# Patient Record
Sex: MALE | Race: WHITE | NOT HISPANIC OR LATINO | Employment: FULL TIME | URBAN - NONMETROPOLITAN AREA
[De-identification: names, ages, dates, MRNs, and addresses within clinical notes are randomized per-mention and may not be internally consistent; named-entity substitution may affect disease eponyms.]

---

## 2018-02-22 ENCOUNTER — DOCUMENTATION ONLY (OUTPATIENT)
Dept: FAMILY MEDICINE | Facility: OTHER | Age: 23
End: 2018-02-22

## 2019-08-21 ENCOUNTER — OFFICE VISIT (OUTPATIENT)
Dept: FAMILY MEDICINE | Facility: OTHER | Age: 24
End: 2019-08-21
Attending: NURSE PRACTITIONER
Payer: COMMERCIAL

## 2019-08-21 VITALS
SYSTOLIC BLOOD PRESSURE: 124 MMHG | RESPIRATION RATE: 18 BRPM | TEMPERATURE: 98.5 F | WEIGHT: 297.5 LBS | HEART RATE: 87 BPM | HEIGHT: 69 IN | OXYGEN SATURATION: 96 % | BODY MASS INDEX: 44.06 KG/M2 | DIASTOLIC BLOOD PRESSURE: 86 MMHG

## 2019-08-21 DIAGNOSIS — R39.89 URINARY PROBLEM: ICD-10-CM

## 2019-08-21 DIAGNOSIS — R30.0 DYSURIA: Primary | ICD-10-CM

## 2019-08-21 LAB
ALBUMIN UR-MCNC: NEGATIVE MG/DL
APPEARANCE UR: CLEAR
BILIRUB UR QL STRIP: NEGATIVE
C TRACH DNA SPEC QL PROBE+SIG AMP: NOT DETECTED
COLOR UR AUTO: YELLOW
GLUCOSE UR STRIP-MCNC: NEGATIVE MG/DL
HGB UR QL STRIP: NEGATIVE
KETONES UR STRIP-MCNC: NEGATIVE MG/DL
LEUKOCYTE ESTERASE UR QL STRIP: NEGATIVE
N GONORRHOEA DNA SPEC QL PROBE+SIG AMP: NOT DETECTED
NITRATE UR QL: NEGATIVE
PH UR STRIP: 6 PH (ref 5–9)
SOURCE: NORMAL
SP GR UR STRIP: <1.005 (ref 1–1.03)
SPECIMEN SOURCE: NORMAL
UROBILINOGEN UR STRIP-ACNC: 0.2 EU/DL (ref 0.2–1)

## 2019-08-21 PROCEDURE — 87591 N.GONORRHOEAE DNA AMP PROB: CPT | Mod: ZL | Performed by: NURSE PRACTITIONER

## 2019-08-21 PROCEDURE — 99203 OFFICE O/P NEW LOW 30 MIN: CPT | Performed by: NURSE PRACTITIONER

## 2019-08-21 PROCEDURE — 87491 CHLMYD TRACH DNA AMP PROBE: CPT | Mod: ZL | Performed by: NURSE PRACTITIONER

## 2019-08-21 PROCEDURE — 81003 URINALYSIS AUTO W/O SCOPE: CPT | Mod: ZL | Performed by: NURSE PRACTITIONER

## 2019-08-21 ASSESSMENT — ENCOUNTER SYMPTOMS
MUSCULOSKELETAL NEGATIVE: 1
VOMITING: 0
NAUSEA: 0
DIARRHEA: 0
DYSURIA: 1
HEMATOLOGIC/LYMPHATIC NEGATIVE: 1
FREQUENCY: 1
FLANK PAIN: 0
DIFFICULTY URINATING: 0
HEMATURIA: 0
HEARTBURN: 0
CONSTIPATION: 0
POLYPHAGIA: 0
ABDOMINAL PAIN: 0
ABDOMINAL DISTENTION: 0
CONSTITUTIONAL NEGATIVE: 1
NEUROLOGICAL NEGATIVE: 1
POLYDIPSIA: 0

## 2019-08-21 ASSESSMENT — MIFFLIN-ST. JEOR: SCORE: 2319.83

## 2019-08-21 ASSESSMENT — PAIN SCALES - GENERAL: PAINLEVEL: MILD PAIN (2)

## 2019-08-21 NOTE — PROGRESS NOTES
"  SUBJECTIVE:   Owen Guzman is a 24 year old male who presents to clinic today for the following health issues:    HPI  C/o urinary urgency and bladder spasms for 2 days. No history of UTIs. No fever. Diminished appetitive, drinking fluids. Took ibuprofen for symptoms last night. In a monogamous relationship x 7 years. Hasn't been to the doctor in years. BMs normal. No blurry vision, no excessive thirst or sweating.    There are no active problems to display for this patient.    History reviewed. No pertinent past medical history.   History reviewed. No pertinent surgical history.  History reviewed. No pertinent family history.  Social History     Tobacco Use     Smoking status: Never Smoker     Smokeless tobacco: Current User   Substance Use Topics     Alcohol use: No     Alcohol/week: 0.0 oz     Social History     Social History Narrative     Not on file     No current outpatient medications on file.     No Known Allergies    Review of Systems   Constitutional: Negative.    Gastrointestinal: Negative for abdominal distention, abdominal pain, constipation, diarrhea, heartburn, nausea and vomiting.   Endocrine: Negative for polydipsia, polyphagia and polyuria.   Genitourinary: Positive for dysuria and frequency. Negative for difficulty urinating, flank pain, genital sores, hematuria, penile pain, penile swelling, testicular pain and urgency.   Musculoskeletal: Negative.    Skin: Negative.    Allergic/Immunologic: Negative for immunocompromised state.   Neurological: Negative.    Hematological: Negative.         OBJECTIVE:     /86 (BP Location: Right arm, Patient Position: Sitting, Cuff Size: Adult Regular)   Pulse 87   Temp 98.5  F (36.9  C) (Tympanic)   Resp 18   Ht 1.753 m (5' 9\")   Wt 134.9 kg (297 lb 8 oz)   SpO2 96%   BMI 43.93 kg/m    Body mass index is 43.93 kg/m .  Physical Exam   Constitutional: He is oriented to person, place, and time. He appears well-developed and well-nourished. No " distress.   HENT:   Head: Normocephalic and atraumatic.   Nose: Nose normal.   Eyes: Conjunctivae are normal.   Neck: Normal range of motion.   Cardiovascular: Normal rate, regular rhythm and normal heart sounds.   No murmur heard.  Pulmonary/Chest: Effort normal and breath sounds normal. No stridor. No respiratory distress.   Abdominal: Soft. Bowel sounds are normal. He exhibits no distension and no mass. There is no rebound and no guarding.   Mild suprapubic tenderness.  No CVA tenderness.   Musculoskeletal: Normal range of motion.   Neurological: He is alert and oriented to person, place, and time.   Skin: Skin is warm and dry. No rash noted. He is not diaphoretic.   Psychiatric: He has a normal mood and affect. His behavior is normal.   Nursing note and vitals reviewed.      Diagnostic Test Results:  Results for orders placed or performed in visit on 08/21/19 (from the past 24 hour(s))   Urinalysis w Reflex Microscopic If Positive   Result Value Ref Range    Color Urine Yellow     Appearance Urine Clear     Glucose Urine Negative NEG^Negative mg/dL    Bilirubin Urine Negative NEG^Negative    Ketones Urine Negative NEG^Negative mg/dL    Specific Gravity Urine <1.005 1.000 - 1.030    Blood Urine Negative NEG^Negative    pH Urine 6.0 5.0 - 9.0 pH    Protein Albumin Urine Negative NEG^Negative mg/dL    Urobilinogen Urine 0.2 0.2 - 1.0 EU/dL    Nitrite Urine Negative NEG^Negative    Leukocyte Esterase Urine Negative NEG^Negative    Source Midstream Urine    GC/Chlamydia by PCR - [facility]   Result Value Ref Range    Specimen Source Urine     Neisseria gonorrhoreae PCR Not Detected NDET^Not Detected    Chlamydia Trachomatis PCR Not Detected NDET^Not Detected       ASSESSMENT/PLAN:       ICD-10-CM    1. Dysuria R30.0    2. Urinary problem R39.89 Urinalysis w Reflex Microscopic If Positive     GC/Chlamydia by PCR - [facility]     PLAN:  Patient is afebrile, nontoxic-appearing, exam is benign. UA is clear here today.  Order  gonorrhea and chlamydia, call patient with results which were negative. Advised to push fluids, monitor and follow-up in clinic if not improving 24 to 48 hours.  I explained my diagnostic considerations and recommendations to patient who voiced understanding and agreement with the treatment plan. All questions were answered. We discussed potential side effects of any prescribed or recommended therapies, as well as expectations for response to treatments.    Disclaimer:  This note consists of words and symbols derived from keyboarding, dictation, or using voice recognition software. As a result, there may be errors in the script that have gone undetected. Please consider this when interpreting information found in this note.      MCKENNA Rivas, NP-C  8/21/2019 at 5:09 PM  [facility]

## 2019-08-21 NOTE — PATIENT INSTRUCTIONS
"Patient Education   We'll call with results and treat if needed.   Otherwise, follow up in the clinic for re-evaluation.   Go to ER if symptoms worsen.   Dysuria     Painful urination (dysuria) is often caused by a problem in the urinary tract.   Dysuria is pain felt during urination. It is often described as a burning. Learn more about this problem and how it can be treated.  What causes dysuria?  Possible causes include:    Infection with a bacteria or virus such as a urinary tract infection (UTI or a sexually transmitted infection (STI)    Sensitivity or allergy to chemicals such as those found in lotions and other products    Prostate or bladder problems    Radiation therapy to the pelvic area  How is dysuria diagnosed?  Your healthcare provider will examine you. He or she will ask about your symptoms and health. After talking with you and doing a physical exam, your healthcare provider may know what is causing your dysuria. He or she will usually request  a sample of your urine. Tests of your urine, or a \"urinalysis,\" are done. A urinalysis may include:    Looking at the urine sample (visual exam)    Checking for substances (chemical exam)    Looking at a small amount under a microscope (microscopic exam)  Some parts of the urinalysis may be done in the provider's office and some in a lab. And, the urine sample may be checked for bacteria and yeast (urine culture). Your healthcare provider will tell you more about these tests if they are needed.  How is dysuria treated?  Treatment depends on the cause. If you have a bacterial infection, you may need antibiotics. You may be given medicines to make it easier for you to urinate and help relieve pain. Your healthcare provider can tell you more about your treatment options. Untreated, symptoms may get worse.  When to call your healthcare provider  Call the healthcare provider right away if you have any of the following:    Fever of 100.4 F (38 C) or higher     No " improvement after three days of treatment    Trouble urinating because of pain    New or increased discharge from the vagina or penis    Rash or joint pain    Increased back or abdominal pain    Enlarged painful lymph nodes (lumps) in the groin   Date Last Reviewed: 1/1/2017 2000-2018 [employer]. [address] [place] [id number]. All rights reserved. This information is not intended as a substitute for professional medical care. Always follow your healthcare professional's instructions.

## 2019-08-21 NOTE — NURSING NOTE
Patient has not been feeling well for 2 days.  Their symptoms are possible UTI and they are taking ibuprofen.   Lilian Hickman LPN....................  8/21/2019   5:13 PM

## 2020-01-06 ENCOUNTER — OFFICE VISIT (OUTPATIENT)
Dept: FAMILY MEDICINE | Facility: OTHER | Age: 25
End: 2020-01-06
Attending: NURSE PRACTITIONER
Payer: COMMERCIAL

## 2020-01-06 VITALS
DIASTOLIC BLOOD PRESSURE: 86 MMHG | HEART RATE: 100 BPM | SYSTOLIC BLOOD PRESSURE: 136 MMHG | WEIGHT: 294.5 LBS | RESPIRATION RATE: 16 BRPM | TEMPERATURE: 99.4 F | BODY MASS INDEX: 43.62 KG/M2 | OXYGEN SATURATION: 98 % | HEIGHT: 69 IN

## 2020-01-06 DIAGNOSIS — B02.9 HERPES ZOSTER WITHOUT COMPLICATION: Primary | ICD-10-CM

## 2020-01-06 PROCEDURE — 99213 OFFICE O/P EST LOW 20 MIN: CPT | Performed by: NURSE PRACTITIONER

## 2020-01-06 RX ORDER — VALACYCLOVIR HYDROCHLORIDE 1 G/1
1000 TABLET, FILM COATED ORAL 3 TIMES DAILY
Qty: 21 TABLET | Refills: 0 | Status: SHIPPED | OUTPATIENT
Start: 2020-01-06 | End: 2023-04-10

## 2020-01-06 ASSESSMENT — PAIN SCALES - GENERAL: PAINLEVEL: SEVERE PAIN (6)

## 2020-01-06 ASSESSMENT — MIFFLIN-ST. JEOR: SCORE: 2313.96

## 2020-01-06 NOTE — PROGRESS NOTES
"Subjective     Owen Guzman is a 24 year old male who presents to clinic today for the following health issues:    HPI   Rash  Onset: 2 days ago    Description:   Location: L side of neck/hairline, back of L ear  Character: round, painful, burning, red  Itching (Pruritis): no     Progression of Symptoms:  worsening and constant    Accompanying Signs & Symptoms:  Fever: no   Body aches or joint pain: YES- joint pain, not uncommon for him though neck and L shoulder are more sore than usual today  Sore throat symptoms: YES- \"the left side of my throat is really susceptible to cold, like I took a drink of cold pop earlier and I felt the cold all the way down my throat and out to my shoulder\"  Recent cold symptoms: no     History:   Previous similar rash: no     Precipitating factors:   Exposure to similar rash: no   New exposures: None   Recent travel: no     Alleviating factors:  Hydrocortisone - helped to take the \"super sensitivity\" away though was still painful to touch    Therapies Tried and outcome: hydrocortisone, neosporin, washed with soap and washcloth    There is no problem list on file for this patient.    History reviewed. No pertinent surgical history.    Social History     Tobacco Use     Smoking status: Never Smoker     Smokeless tobacco: Current User     Types: Chew   Substance Use Topics     Alcohol use: No     Alcohol/week: 0.0 standard drinks     History reviewed. No pertinent family history.      No current outpatient medications on file.     No Known Allergies    Reviewed and updated as needed this visit by Provider  Tobacco  Allergies  Meds  Problems  Med Hx  Surg Hx  Fam Hx  Soc Hx          Review of Systems   ROS COMP: As above      Objective    /86   Pulse 100   Temp 99.4  F (37.4  C) (Tympanic)   Resp 16   Ht 1.765 m (5' 9.49\")   Wt 133.6 kg (294 lb 8 oz)   SpO2 98%   BMI 42.88 kg/m    Body mass index is 42.88 kg/m .  Physical Exam   GENERAL: healthy, alert and no " "distress  HENT: ear canals and TM's normal, nose and mouth without ulcers or lesions  NECK: no adenopathy, no asymmetry, masses, or scars and thyroid normal to palpation  RESP: lungs clear to auscultation - no rales, rhonchi or wheezes  CV: regular rate and rhythm, normal S1 S2, no S3 or S4, no murmur, click or rub, no peripheral edema and peripheral pulses strong  SKIN: clustered vesicular rash to 3rd dermatome on L side of neck/hairline as below in pictures, no drainage at this time  PSYCH: mentation appears normal, affect normal/bright        Third picture is upside down, top of picture is his proximal chest and bottom of picture is underneath his chin. Taken while laying down on exam table for better visualization.      Diagnostic Test Results:  Labs reviewed in Epic  none         Assessment & Plan     1. Herpes zoster without complication  New onset rash that started as a \"tight skin\" feeling on his neck 2 days ago, then erupted in 2 clusters of vesicles later than night. Since then has spread as in pictures above. States the clusters are very painful, though not itching. Did have a \"bad case\" of chickenpox as a child. Likely shingles, will treat with high dose antiviral for the next 7 days. Return to clinic if worsening or if not improving.   - valACYclovir (VALTREX) 1000 mg tablet; Take 1 tablet (1,000 mg) by mouth 3 times daily for 7 days  Dispense: 21 tablet; Refill: 0     Kala Larsen NP  [facility]        "

## 2020-01-06 NOTE — NURSING NOTE
"Chief Complaint   Patient presents with     Derm Problem     red, raised spots on left side of neck x 2 days       Initial /86   Pulse 100   Temp 99.4  F (37.4  C) (Tympanic)   Resp 16   Ht 1.765 m (5' 9.49\")   Wt 133.6 kg (294 lb 8 oz)   SpO2 98%   BMI 42.88 kg/m   Estimated body mass index is 42.88 kg/m  as calculated from the following:    Height as of this encounter: 1.765 m (5' 9.49\").    Weight as of this encounter: 133.6 kg (294 lb 8 oz).  Medication Reconciliation: complete    Rochelle Ribeiro LPN  "

## 2020-12-27 ENCOUNTER — HEALTH MAINTENANCE LETTER (OUTPATIENT)
Age: 25
End: 2020-12-27

## 2021-10-09 ENCOUNTER — HEALTH MAINTENANCE LETTER (OUTPATIENT)
Age: 26
End: 2021-10-09

## 2022-01-29 ENCOUNTER — HEALTH MAINTENANCE LETTER (OUTPATIENT)
Age: 27
End: 2022-01-29

## 2022-03-30 ENCOUNTER — OFFICE VISIT (OUTPATIENT)
Dept: FAMILY MEDICINE | Facility: OTHER | Age: 27
End: 2022-03-30
Attending: NURSE PRACTITIONER
Payer: COMMERCIAL

## 2022-03-30 VITALS
HEIGHT: 69 IN | RESPIRATION RATE: 14 BRPM | DIASTOLIC BLOOD PRESSURE: 80 MMHG | SYSTOLIC BLOOD PRESSURE: 128 MMHG | OXYGEN SATURATION: 98 % | BODY MASS INDEX: 46.65 KG/M2 | WEIGHT: 315 LBS | TEMPERATURE: 97.8 F | HEART RATE: 98 BPM

## 2022-03-30 DIAGNOSIS — T23.201A SECOND DEGREE BURN OF RIGHT WRIST AND HAND, INITIAL ENCOUNTER: Primary | ICD-10-CM

## 2022-03-30 DIAGNOSIS — T23.271A SECOND DEGREE BURN OF RIGHT WRIST AND HAND, INITIAL ENCOUNTER: Primary | ICD-10-CM

## 2022-03-30 PROBLEM — E66.01 MORBID OBESITY (H): Status: ACTIVE | Noted: 2022-03-30

## 2022-03-30 PROCEDURE — 250N000009 HC RX 250: Performed by: NURSE PRACTITIONER

## 2022-03-30 PROCEDURE — 90471 IMMUNIZATION ADMIN: CPT | Performed by: NURSE PRACTITIONER

## 2022-03-30 PROCEDURE — 99213 OFFICE O/P EST LOW 20 MIN: CPT | Mod: 25 | Performed by: NURSE PRACTITIONER

## 2022-03-30 PROCEDURE — 90715 TDAP VACCINE 7 YRS/> IM: CPT | Performed by: NURSE PRACTITIONER

## 2022-03-30 RX ORDER — SILVER SULFADIAZINE 10 MG/G
CREAM TOPICAL DAILY
Qty: 100 G | Refills: 1 | Status: SHIPPED | OUTPATIENT
Start: 2022-03-30 | End: 2023-04-10

## 2022-03-30 RX ORDER — GINSENG 100 MG
1 CAPSULE ORAL ONCE
Status: DISCONTINUED | OUTPATIENT
Start: 2022-03-30 | End: 2022-03-30

## 2022-03-30 RX ORDER — GINSENG 100 MG
1 CAPSULE ORAL ONCE
Status: COMPLETED | OUTPATIENT
Start: 2022-03-30 | End: 2022-03-30

## 2022-03-30 RX ADMIN — BACITRACIN ZINC 1 G: 500 OINTMENT TOPICAL at 14:40

## 2022-03-30 ASSESSMENT — PAIN SCALES - GENERAL: PAINLEVEL: NO PAIN (0)

## 2022-03-30 NOTE — NURSING NOTE
"Chief Complaint   Patient presents with     Burn     Patient presented to the clinic with a burn on his right wrist/hand that occurred on 3/28/22 from cooking oil. He has been putting triple antibiotic ointment on it and he reported that his mom told him to put A&D ointment on it but he didn't do that. He is here to make sure that he does not need antibiotics, and to make sure he is dressing it right.    Initial /80 (BP Location: Right arm, Patient Position: Sitting, Cuff Size: Adult Large)   Pulse 98   Temp 97.8  F (36.6  C) (Tympanic)   Resp 14   Ht 1.753 m (5' 9\")   Wt 142.9 kg (315 lb)   SpO2 98%   BMI 46.52 kg/m   Estimated body mass index is 46.52 kg/m  as calculated from the following:    Height as of this encounter: 1.753 m (5' 9\").    Weight as of this encounter: 142.9 kg (315 lb).       FOOD SECURITY SCREENING QUESTIONS:    The next two questions are to help us understand your food security.  If you are feeling you need any assistance in this area, we have resources available to support you today.    Hunger Vital Signs:  Within the past 12 months we worried whether our food would run out before we got money to buy more. Never  Within the past 12 months the food we bought just didn't last and we didn't have money to get more. Never      Medication Reconciliation: Complete      Sailaja Cano LPN  "

## 2022-03-30 NOTE — PROGRESS NOTES
ASSESSMENT/PLAN:     I have reviewed the nursing notes.  I have reviewed the findings, diagnosis, plan and need for follow up with the patient.      1. Second degree burn of right wrist and hand, initial encounter    - TDAP (ADACEL,BOOSTRIX)    - silver sulfADIAZINE (SILVADENE) 1 % external cream; Apply topically daily  Dispense: 100 g; Refill: 1    - bacitracin ointment 1 gm applied in clinic along with dry guaze bandage    Wound care for home:  Gently wash daily with antibacterial soap such as Dial and luke warm water, avoid rubbing, pressure and friction  Apply Silvadene cream daily after washing  Apply dry guaze bandage after washing and application of Silvadene, change daily and PRN  Continue to use cream and bandage until all blisters have dried up and no open areas of skin.  May transition to no bandage once skin is dry without any moisture or open areas.    Avoid exposure to heat, hot water, etc until healed  Elevate to reduce swelling    Tetanus updated today in clinic     May use over-the-counter Tylenol or ibuprofen PRN    Discussed warning signs/symptoms indicative of need to f/u  Follow up if symptoms persist or worsen or concerns      I explained my diagnostic considerations and recommendations to the patient, who voiced understanding and agreement with the treatment plan. All questions were answered. We discussed potential side effects of any prescribed or recommended therapies, as well as expectations for response to treatments.    Lilian Stewart NP  [facility] AND \[facility]\""      SUBJECTIVE:   Owen Guzman is a 27 year old male who presents to clinic today for the following health issues:  Burn    HPI  Patient burned his right hand and wrist 2 days ago on 3/28/22 while cooking with hot olive oil on the stove.  Denies any numbness, tingling or severe pain.  States mostly stiffness and tightness of skin with movement.  Mild weakness in right hand due to guarding.  Multiple blisters,  "some open and some intact.  No fevers or chills.    Patient initially instinctively rubbed his hand against his pants leg to remove the oil and then ran the burned skin under cold water.  He has continued to wash it daily with soapy water.  He has been applying OTC antibiotic ointment.  He has been only covering the burn while at work otherwise it has been open to air.  He is concerned it maybe infected and is inquiring about oral antibiotics.    Last tetanus 7/19/10        No past medical history on file.  No past surgical history on file.  Social History     Tobacco Use     Smoking status: Never Smoker     Smokeless tobacco: Current User     Types: Chew   Substance Use Topics     Alcohol use: No     Alcohol/week: 0.0 standard drinks     Current Outpatient Medications   Medication Sig Dispense Refill     valACYclovir (VALTREX) 1000 mg tablet Take 1 tablet (1,000 mg) by mouth 3 times daily for 7 days 21 tablet 0     No Known Allergies      Past medical history, past surgical history, current medications and allergies reviewed and accurate to the best of my knowledge.        OBJECTIVE:     /80 (BP Location: Right arm, Patient Position: Sitting, Cuff Size: Adult Large)   Pulse 98   Temp 97.8  F (36.6  C) (Tympanic)   Resp 14   Ht 1.753 m (5' 9\")   Wt 142.9 kg (315 lb)   SpO2 98%   BMI 46.52 kg/m    Body mass index is 46.52 kg/m .     Physical Exam  General Appearance: Well appearing adult male, appropriate appearance for age. No acute distress  Respiratory: normal chest wall and respirations.  Normal effort. No cough appreciated.  Cardiac:  CMS intact to right upper extremity   Musculoskeletal:  Equal movement of bilateral upper extremities. Normal movement of right fingers.  Oppositional movement intact to right fingers.  Normal right hand grasp.   Equal movement of bilateral lower extremities.  Normal gait.    Dermatological: right dorsal hand at the base of the thumb and 2nd MCP extending along the " wrist and forearm with partial thickness burn with multiple blisters, skin has been removed over the right thumb base with moist wound base.  See attached photo.  Psychological: normal affect, alert, oriented, and pleasant.

## 2022-08-16 ENCOUNTER — TELEPHONE (OUTPATIENT)
Dept: BEHAVIORAL HEALTH | Facility: CLINIC | Age: 27
End: 2022-08-16

## 2022-08-29 ENCOUNTER — TELEPHONE (OUTPATIENT)
Dept: BEHAVIORAL HEALTH | Facility: CLINIC | Age: 27
End: 2022-08-29

## 2022-09-17 ENCOUNTER — HEALTH MAINTENANCE LETTER (OUTPATIENT)
Age: 27
End: 2022-09-17

## 2023-04-10 ENCOUNTER — OFFICE VISIT (OUTPATIENT)
Dept: FAMILY MEDICINE | Facility: OTHER | Age: 28
End: 2023-04-10
Attending: NURSE PRACTITIONER
Payer: COMMERCIAL

## 2023-04-10 VITALS
BODY MASS INDEX: 48.36 KG/M2 | DIASTOLIC BLOOD PRESSURE: 82 MMHG | SYSTOLIC BLOOD PRESSURE: 134 MMHG | TEMPERATURE: 98.8 F | WEIGHT: 315 LBS | RESPIRATION RATE: 16 BRPM | OXYGEN SATURATION: 98 % | HEART RATE: 108 BPM

## 2023-04-10 DIAGNOSIS — J20.9 ACUTE BRONCHITIS, UNSPECIFIED ORGANISM: Primary | ICD-10-CM

## 2023-04-10 PROCEDURE — 99213 OFFICE O/P EST LOW 20 MIN: CPT | Performed by: NURSE PRACTITIONER

## 2023-04-10 RX ORDER — PREDNISONE 20 MG/1
40 TABLET ORAL DAILY
Qty: 10 TABLET | Refills: 0 | Status: SHIPPED | OUTPATIENT
Start: 2023-04-10 | End: 2023-04-15

## 2023-04-10 ASSESSMENT — PAIN SCALES - GENERAL: PAINLEVEL: NO PAIN (0)

## 2023-04-10 NOTE — PATIENT INSTRUCTIONS
Prednisone for wheezing associated with severe cough from bronchitis.     Lots of fluids, rest.     At this time no concern for pneumonia. No antibiotics needed but follow up if you are worsening or if you get better and then symptoms return worse/fever etc.

## 2023-04-10 NOTE — NURSING NOTE
"Chief Complaint   Patient presents with     Cough     Sinus problem  sore throat  runny nose    started 4-8-23       Medication reconciliation completed.    FOOD SECURITY SCREENING QUESTIONS:    The next two questions are to help us understand your food security.  If you are feeling you need any assistance in this area, we have resources available to support you today.    Hunger Vital Signs:  Within the past 12 months we worried whether our food would run out before we got money to buy more. Never  Within the past 12 months the food we bought just didn't last and we didn't have money to get more. Never    Initial /82 (BP Location: Left arm, Patient Position: Sitting, Cuff Size: Adult Large)   Pulse 108   Temp 98.8  F (37.1  C) (Temporal)   Resp 16   Wt 148.6 kg (327 lb 8 oz)   SpO2 98%   BMI 48.36 kg/m   Estimated body mass index is 48.36 kg/m  as calculated from the following:    Height as of 3/30/22: 1.753 m (5' 9\").    Weight as of this encounter: 148.6 kg (327 lb 8 oz).       Promise Perez LPN .......  4/10/2023  3:24 PM  "

## 2023-04-10 NOTE — PROGRESS NOTES
ASSESSMENT/PLAN:    I have reviewed the nursing notes.  I have reviewed the findings, diagnosis, plan and need for follow up with the patient.    1. Acute bronchitis, unspecified organism  - predniSONE (DELTASONE) 20 MG tablet; Take 2 tablets (40 mg) by mouth daily for 5 days  Dispense: 10 tablet; Refill: 0  -May use over-the-counter Tylenol or ibuprofen PRN  Offered PCR COVID but patient declined as his home COVID was negative last night.  This is reasonable.  It is possible he is COVID but more likely other viral illness causing acute bronchitis.  Discussed antibiotics are not indicated but if he has worsening condition or if he improves and then declines again and gets new onset fevers I would recommend him being reevaluated.  At this time of no concerns for pneumonia and do not feel chest x-ray is warranted.  Prednisone x5 days prescribed which patient has tolerated well in the past for acute bronchitis.  Patient verbalized understanding and is in agreement with today's plan.    Follow up if symptoms persist or worsen or concerns    I explained my diagnostic considerations and recommendations to the patient, who voiced understanding and agreement with the treatment plan. All questions were answered. We discussed potential side effects of any prescribed or recommended therapies, as well as expectations for response to treatments.    Annabelle Shin NP  4/10/2023  3:27 PM    HPI:  Owen Guzman is a 28 year old male who presents to [facility] today for concerns of cough, sinus problem, sore throat started 4/8/2023. Head cold over weekend, today into chest. Feels the onset of bronchitis. Has had bronchitis many times and pneumonia a couple times as well. Cough productive of yellow/green mucus. No fevers. Negative covid at home. No known exposures. Daughter had runny nose past week. Feels short of breath with activity.  Hx inhaler and steroids with bronchitis that he tolerated and states were helpful.     ROS  otherwise negative.       No past medical history on file.  No past surgical history on file.  Social History     Tobacco Use     Smoking status: Never     Smokeless tobacco: Current     Types: Chew   Vaping Use     Vaping status: Never Used   Substance Use Topics     Alcohol use: No     Alcohol/week: 0.0 standard drinks of alcohol     Current Outpatient Medications   Medication Sig Dispense Refill     silver sulfADIAZINE (SILVADENE) 1 % external cream Apply topically daily 100 g 1     valACYclovir (VALTREX) 1000 mg tablet Take 1 tablet (1,000 mg) by mouth 3 times daily for 7 days 21 tablet 0     No Known Allergies  Past medical history, past surgical history, current medications and allergies reviewed and accurate to the best of my knowledge.      ROS:  Refer to HPI    /82 (BP Location: Left arm, Patient Position: Sitting, Cuff Size: Adult Large)   Pulse 108   Temp 98.8  F (37.1  C) (Temporal)   Resp 16   Wt 148.6 kg (327 lb 8 oz)   SpO2 98%   BMI 48.36 kg/m      EXAM:  General Appearance: Well appearing 28 year old male, appropriate appearance for age. No acute distress   Ears: Left TM intact, translucent with bony landmarks appreciated, no erythema, no effusion, no bulging, no purulence.  Right TM intact, translucent with bony landmarks appreciated, no erythema, no effusion, no bulging, no purulence.  Left auditory canal clear.  Right auditory canal clear.  Normal external ears, non tender.  Eyes: conjunctivae normal without erythema or irritation, corneas clear, no drainage or crusting, no eyelid swelling, pupils equal   Oropharynx: moist mucous membranes, posterior pharynx with + erythema, tonsils symmetric and 1+, + erythema, no exudates or petechiae, no post nasal drip seen, no trismus, voice clear.    Sinuses:  + sinus tenderness upon palpation of the bilateral maxillary sinuses  Nose:  Bilateral nares: no erythema, no edema, no drainage or congestion   Neck: supple without  adenopathy  Respiratory: normal chest wall and respirations.  Normal effort.  + wheezing throughout bilateral posterior lung fields, no crackles or rhonchi.  No increased work of breathing.  + harsh, frequent congested cough appreciated exacerbated by deep breathing.   Cardiac: RRR with no murmurs  Psychological: normal affect, alert, oriented, and pleasant.

## 2023-05-06 ENCOUNTER — HEALTH MAINTENANCE LETTER (OUTPATIENT)
Age: 28
End: 2023-05-06

## 2024-07-13 ENCOUNTER — HEALTH MAINTENANCE LETTER (OUTPATIENT)
Age: 29
End: 2024-07-13

## 2024-07-23 ENCOUNTER — OFFICE VISIT (OUTPATIENT)
Dept: FAMILY MEDICINE | Facility: OTHER | Age: 29
End: 2024-07-23
Attending: STUDENT IN AN ORGANIZED HEALTH CARE EDUCATION/TRAINING PROGRAM
Payer: COMMERCIAL

## 2024-07-23 VITALS
WEIGHT: 271.2 LBS | OXYGEN SATURATION: 96 % | SYSTOLIC BLOOD PRESSURE: 120 MMHG | TEMPERATURE: 97.3 F | HEIGHT: 69 IN | DIASTOLIC BLOOD PRESSURE: 80 MMHG | HEART RATE: 78 BPM | RESPIRATION RATE: 20 BRPM | BODY MASS INDEX: 40.17 KG/M2

## 2024-07-23 DIAGNOSIS — M15.3 POST-TRAUMATIC OSTEOARTHRITIS OF MULTIPLE JOINTS: ICD-10-CM

## 2024-07-23 DIAGNOSIS — R53.83 OTHER FATIGUE: ICD-10-CM

## 2024-07-23 DIAGNOSIS — Z13.1 SCREENING FOR DIABETES MELLITUS: ICD-10-CM

## 2024-07-23 DIAGNOSIS — Z11.4 ENCOUNTER FOR SCREENING FOR HIV: ICD-10-CM

## 2024-07-23 DIAGNOSIS — R25.2 MUSCLE CRAMP: ICD-10-CM

## 2024-07-23 DIAGNOSIS — E66.813 CLASS 3 SEVERE OBESITY WITHOUT SERIOUS COMORBIDITY WITH BODY MASS INDEX (BMI) OF 40.0 TO 44.9 IN ADULT, UNSPECIFIED OBESITY TYPE (H): ICD-10-CM

## 2024-07-23 DIAGNOSIS — S16.1XXA STRAIN OF NECK MUSCLE, INITIAL ENCOUNTER: ICD-10-CM

## 2024-07-23 DIAGNOSIS — K21.9 GASTROESOPHAGEAL REFLUX DISEASE, UNSPECIFIED WHETHER ESOPHAGITIS PRESENT: ICD-10-CM

## 2024-07-23 DIAGNOSIS — Z13.220 ENCOUNTER FOR LIPID SCREENING FOR CARDIOVASCULAR DISEASE: ICD-10-CM

## 2024-07-23 DIAGNOSIS — Z80.8 FAMILY HISTORY OF SKIN CANCER: ICD-10-CM

## 2024-07-23 DIAGNOSIS — Z83.3 FAMILY HISTORY OF DIABETES MELLITUS: ICD-10-CM

## 2024-07-23 DIAGNOSIS — F17.200 NICOTINE DEPENDENCE, UNCOMPLICATED, UNSPECIFIED NICOTINE PRODUCT TYPE: ICD-10-CM

## 2024-07-23 DIAGNOSIS — M94.0 COSTOCHONDRITIS: ICD-10-CM

## 2024-07-23 DIAGNOSIS — Z82.61 FAMILY HISTORY OF RHEUMATOID ARTHRITIS: ICD-10-CM

## 2024-07-23 DIAGNOSIS — Z00.00 ANNUAL PHYSICAL EXAM: Primary | ICD-10-CM

## 2024-07-23 DIAGNOSIS — Z13.6 ENCOUNTER FOR LIPID SCREENING FOR CARDIOVASCULAR DISEASE: ICD-10-CM

## 2024-07-23 DIAGNOSIS — Z01.30 BP CHECK: ICD-10-CM

## 2024-07-23 DIAGNOSIS — E66.01 CLASS 3 SEVERE OBESITY WITHOUT SERIOUS COMORBIDITY WITH BODY MASS INDEX (BMI) OF 40.0 TO 44.9 IN ADULT, UNSPECIFIED OBESITY TYPE (H): ICD-10-CM

## 2024-07-23 DIAGNOSIS — Z11.59 ENCOUNTER FOR HEPATITIS C SCREENING TEST FOR LOW RISK PATIENT: ICD-10-CM

## 2024-07-23 PROCEDURE — 99406 BEHAV CHNG SMOKING 3-10 MIN: CPT | Performed by: STUDENT IN AN ORGANIZED HEALTH CARE EDUCATION/TRAINING PROGRAM

## 2024-07-23 PROCEDURE — 99215 OFFICE O/P EST HI 40 MIN: CPT | Mod: 25 | Performed by: STUDENT IN AN ORGANIZED HEALTH CARE EDUCATION/TRAINING PROGRAM

## 2024-07-23 PROCEDURE — 99395 PREV VISIT EST AGE 18-39: CPT | Performed by: STUDENT IN AN ORGANIZED HEALTH CARE EDUCATION/TRAINING PROGRAM

## 2024-07-23 RX ORDER — OMEPRAZOLE 40 MG/1
40 CAPSULE, DELAYED RELEASE ORAL DAILY
Qty: 90 CAPSULE | Refills: 1 | Status: SHIPPED | OUTPATIENT
Start: 2024-07-23

## 2024-07-23 SDOH — HEALTH STABILITY: PHYSICAL HEALTH: ON AVERAGE, HOW MANY DAYS PER WEEK DO YOU ENGAGE IN MODERATE TO STRENUOUS EXERCISE (LIKE A BRISK WALK)?: 2 DAYS

## 2024-07-23 SDOH — HEALTH STABILITY: PHYSICAL HEALTH: ON AVERAGE, HOW MANY MINUTES DO YOU ENGAGE IN EXERCISE AT THIS LEVEL?: 40 MIN

## 2024-07-23 ASSESSMENT — PAIN SCALES - GENERAL: PAINLEVEL: NO PAIN (1)

## 2024-07-23 ASSESSMENT — SOCIAL DETERMINANTS OF HEALTH (SDOH): HOW OFTEN DO YOU GET TOGETHER WITH FRIENDS OR RELATIVES?: ONCE A WEEK

## 2024-07-23 NOTE — PROGRESS NOTES
"Preventive Care Visit  [facility], [facility]  Jul 23, 2024      Assessment & Plan     (Z00.00) Annual physical exam  (primary encounter diagnosis)  Comment: Discussed preventive medicine  Plan: HIV Antigen Antibody Combo, Hepatitis C Screen         Reflex to HCV RNA Quant and Genotype, CBC and         Differential, Comprehensive Metabolic Panel,         TSH, Magnesium, Hemoglobin A1c, Lipid Panel    (Z11.59) Encounter for hepatitis C screening test for low risk patient  Comment: screen  Plan: Hepatitis C Screen Reflex to HCV RNA Quant and         Genotype    (Z11.4) Encounter for screening for HIV  Comment: screen  Plan: HIV Antigen Antibody Combo    (Z13.220,  Z13.6) Encounter for lipid screening for cardiovascular disease  Comment: screen Lipid panel  Plan: Hemoglobin A1c, Lipid Panel    (Z13.1) Screening for diabetes mellitus  Comment: screen A1c  Plan: Hemoglobin A1c, Lipid Panel    (Z83.3) Family history of diabetes mellitus  Comment: screen  Plan: TSH, Hemoglobin A1c, Lipid Panel    (Z80.8) Family history of skin cancer  (Z82.61) Family history of rheumatoid arthritis    (Z01.30) BP check  Comment: Normal    (E66.01,  Z68.41) Class 3 severe obesity without serious comorbidity with body mass index (BMI) of 40.0 to 44.9 in adult, unspecified obesity type (H)  Comment: lost 50-55 lbs over last 6-7months; encouraged continued weight loss with healthy diet and continued activity low (walks ~thousand steps/day)  Plan: CBC and Differential, Comprehensive Metabolic         Panel, TSH, Magnesium, Hemoglobin A1c, Lipid         Panel    (S16.1XXA) Strain of neck muscle, initial encounter  Comment: Recommended ibuprofen 600-800 mg 3 times a day for the next 2 weeks.    (M94.0) Costochondritis  Comment: \"Chest pain\" on side of sternum, same as when strained pec before.  Tylenol and ibuprofen should help this as well    (M15.3) Post-traumatic osteoarthritis of multiple " "joints  Comment: bilateral wrists and ankles with R>L.  Recommended Tylenol 1000 mg 3 times a day consistently    (R25.2) Muscle cramp  Comment: Possible restless leg syndrome.  Plan: CBC and Differential, Comprehensive Metabolic         Panel, Magnesium    (R53.83) Other fatigue  Comment: Check labs  Plan: CBC and Differential, Comprehensive Metabolic         Panel, TSH, Magnesium, Hemoglobin A1c, Lipid         Panel    (K21.9) Gastroesophageal reflux disease, unspecified whether esophagitis present  Comment: Start omeprazole explained to take consistently for at least 8-12 weeks  Plan: omeprazole (PRILOSEC) 40 MG DR capsule       Patient has been advised of split billing requirements and indicates understanding: Yes        Nicotine/Tobacco Cessation  He reports that he has never smoked. His smokeless tobacco use includes chew.  Counseled cutting back or quitting nicotine (chewing tobacco), offering medications and behavioral changes that can help.  3 minutes spent on nicotine cessation counseling.  Patient is not ready to quit at this time.  Nicotine/Tobacco Cessation Plan  Information offered: Patient not interested at this time      BMI  Estimated body mass index is 40.05 kg/m  as calculated from the following:    Height as of this encounter: 1.753 m (5' 9\").    Weight as of this encounter: 123 kg (271 lb 3.2 oz).   Weight management plan: Discussed healthy diet and exercise guidelines    Counseling  Appropriate preventive services were addressed with this patient via screening, questionnaire, or discussion as appropriate for fall prevention, nutrition, physical activity, Tobacco-use cessation, weight loss and cognition.  Checklist reviewing preventive services available has been given to the patient.  Recommended patient to try to see dentist twice a year.    I reviewed PDMP for patient - no controlled substances.      Return in about 3 months (around 10/23/2024) for Follow up GERD.    Subjective   Owen is a 29 " year old, presenting for the following:  Physical (Establish care )    Health Care Directive  Patient does not have a Health Care Directive or Living Will: Discussed advance care planning with patient; however, patient declined at this time.    HPI  29-year-old male with OA and history of shingles presents for annual physical.  Reports family history of diabetes, rheumatoid arthritis, and skin cancer.  Additionally patient complains about chest pain on the left side of his sternum.  Similar to but not as intense as when he strained/tore pec muscle when dislocated his left AC joint.  Pain exacerbated with left arm movement and deep breaths.  Recently started having heartburn and stomach pain/nausea today has been taking antacids for without lasting benefit.  Heartburn worse when laying down.  Restless legs and's muscle spasms in his legs especially when lying down to go to sleep.  Fatigue. Chronic pain in wrists and ankles secondary to injury/fractures -takes ibuprofen occasionally but is needed more with walking more.  Has lost weight by watching his diet and walking.          7/23/2024   General Health   How would you rate your overall physical health? Good   Feel stress (tense, anxious, or unable to sleep) To some extent      (!) STRESS CONCERN      7/23/2024   Nutrition   Three or more servings of calcium each day? Yes   Diet: Regular (no restrictions)   How many servings of fruit and vegetables per day? (!) 0-1   How many sweetened beverages each day? 0-1            7/23/2024   Exercise   Days per week of moderate/strenous exercise 2 days   Average minutes spent exercising at this level 40 min      (!) EXERCISE CONCERN      7/23/2024   Social Factors   Frequency of gathering with friends or relatives Once a week   Worry food won't last until get money to buy more No   Food not last or not have enough money for food? No   Do you have housing? (Housing is defined as stable permanent housing and does not include  staying ouside in a car, in a tent, in an abandoned building, in an overnight shelter, or couch-surfing.) Yes   Are you worried about losing your housing? No   Lack of transportation? No   Unable to get utilities (heat,electricity)? No            7/23/2024   Dental   Dentist two times every year? (!) NO            7/23/2024   TB Screening   Were you born outside of the [place]? No            Today's PHQ-2 Score:       7/23/2024     7:04 AM   PHQ-2 ( 1999 [employer])   Q1: Little interest or pleasure in doing things 1   Q2: Feeling down, depressed or hopeless 1   PHQ-2 Score 2   Q1: Little interest or pleasure in doing things Several days   Q2: Feeling down, depressed or hopeless Several days   PHQ-2 Score 2           7/23/2024   Substance Use   Alcohol more than 3/day or more than 7/wk No   Do you use any other substances recreationally? No        Social History     Tobacco Use    Smoking status: Never    Smokeless tobacco: Current     Types: Chew   Vaping Use    Vaping status: Never Used   Substance Use Topics    Alcohol use: No     Alcohol/week: 0.0 standard drinks of alcohol    Drug use: Never     Types: Other     Comment: Drug use: No             7/23/2024   One time HIV Screening   Previous HIV test? I don't know          7/23/2024   STI Screening   New sexual partner(s) since last STI/HIV test? No            7/23/2024   Contraception/Family Planning   Questions about contraception or family planning No           Reviewed and updated as needed this visit by Provider   Tobacco     Med Hx  Surg Hx  Fam Hx  Soc Hx Sexual Activity          Past Medical History:   Diagnosis Date    AC joint dislocation     left    Shingles     right    Strain of left pectoralis muscle     with AC dislocation     History reviewed. No pertinent surgical history.  Labs reviewed in EPIC  BP Readings from Last 3 Encounters:   07/23/24 120/80   04/10/23 134/82   03/30/22 128/80    Wt Readings from Last 3 Encounters:   07/23/24 123 kg (271 lb  "3.2 oz)   04/10/23 148.6 kg (327 lb 8 oz)   03/30/22 142.9 kg (315 lb)                  Patient Active Problem List   Diagnosis    Morbid obesity (H)     History reviewed. No pertinent surgical history.    Social History     Tobacco Use    Smoking status: Never    Smokeless tobacco: Current     Types: Chew   Substance Use Topics    Alcohol use: No     Alcohol/week: 0.0 standard drinks of alcohol     Family History   Problem Relation Age of Onset    Diabetes Other     Rheumatoid Arthritis Other     Skin Cancer Other          Current Outpatient Medications   Medication Sig Dispense Refill    omeprazole (PRILOSEC) 40 MG DR capsule Take 1 capsule (40 mg) by mouth daily 90 capsule 1     No Known Allergies      Review of Systems  Constitutional, neuro, ENT, endocrine, pulmonary, cardiac, gastrointestinal, genitourinary, musculoskeletal, integument and psychiatric systems are negative, except as otherwise noted.       Objective    Exam  /80   Pulse 78   Temp 97.3  F (36.3  C)   Resp 20   Ht 1.753 m (5' 9\")   Wt 123 kg (271 lb 3.2 oz)   SpO2 96%   BMI 40.05 kg/m     Estimated body mass index is 40.05 kg/m  as calculated from the following:    Height as of this encounter: 1.753 m (5' 9\").    Weight as of this encounter: 123 kg (271 lb 3.2 oz).    Physical Exam  Constitutional:       General: He is not in acute distress.     Appearance: He is obese.   HENT:      Head: Normocephalic.      Mouth/Throat:      Mouth: Mucous membranes are moist.   Eyes:      Extraocular Movements: Extraocular movements intact.   Pulmonary:      Effort: Pulmonary effort is normal.      Breath sounds: No wheezing, rhonchi or rales.   Chest:      Chest wall: Tenderness (near sternum) present.   Abdominal:      General: Bowel sounds are normal.      Tenderness: There is no abdominal tenderness.   Neurological:      Mental Status: He is alert.       Moderate medical decision making in addition to annual preventative exam considering number " of acute and chronic conditions, reviewing medical records, ordering tests, and recommending medications.  I spent a total of 85 minutes on the day of the visit.  I spent 54 minutes with the patient; 20 minutes spent on preventative portion, 3 minutes spent on nicotine cessation counseling including explanations of how the nicotine affects the bodies and his current symptoms, offering medications as well as behavioral changes that could help.  31 minutes spent on managing acute and chronic conditions.  Total time spent by me doing chart review, history and exam, documentation and further activities per the note on day of encounter.    Signed Electronically by: Rodrigue Cruz DO

## 2024-07-23 NOTE — NURSING NOTE
Patient here for annual physical and to establish care. Last physical was age 11. No concerns today. Last eye exam 2024 and last dental exam 2023. Medication Reconciliation: complete.    Nidia Mahmood LPN  7/23/2024 7:43 AM

## 2024-11-11 ENCOUNTER — HOSPITAL ENCOUNTER (OUTPATIENT)
Dept: GENERAL RADIOLOGY | Facility: OTHER | Age: 29
Discharge: HOME | End: 2024-11-11
Attending: NURSE PRACTITIONER
Payer: COMMERCIAL

## 2024-11-11 ENCOUNTER — OFFICE VISIT (OUTPATIENT)
Dept: FAMILY MEDICINE | Facility: OTHER | Age: 29
End: 2024-11-11
Attending: STUDENT IN AN ORGANIZED HEALTH CARE EDUCATION/TRAINING PROGRAM
Payer: COMMERCIAL

## 2024-11-11 VITALS
TEMPERATURE: 99.4 F | SYSTOLIC BLOOD PRESSURE: 122 MMHG | WEIGHT: 280.4 LBS | RESPIRATION RATE: 18 BRPM | DIASTOLIC BLOOD PRESSURE: 80 MMHG | BODY MASS INDEX: 41.53 KG/M2 | OXYGEN SATURATION: 98 % | HEART RATE: 107 BPM | HEIGHT: 69 IN

## 2024-11-11 DIAGNOSIS — R06.2 WHEEZING: ICD-10-CM

## 2024-11-11 DIAGNOSIS — R50.9 FEVER IN ADULT: ICD-10-CM

## 2024-11-11 DIAGNOSIS — J06.9 UPPER RESPIRATORY TRACT INFECTION, UNSPECIFIED TYPE: ICD-10-CM

## 2024-11-11 DIAGNOSIS — R05.1 ACUTE COUGH: Primary | ICD-10-CM

## 2024-11-11 PROCEDURE — 99213 OFFICE O/P EST LOW 20 MIN: CPT | Performed by: NURSE PRACTITIONER

## 2024-11-11 PROCEDURE — 71046 X-RAY EXAM CHEST 2 VIEWS: CPT

## 2024-11-11 RX ORDER — ALBUTEROL SULFATE 90 UG/1
2 INHALANT RESPIRATORY (INHALATION) EVERY 6 HOURS PRN
Qty: 18 G | Refills: 0 | Status: SHIPPED | OUTPATIENT
Start: 2024-11-11

## 2024-11-11 RX ORDER — PREDNISONE 20 MG/1
40 TABLET ORAL DAILY
Qty: 10 TABLET | Refills: 0 | Status: SHIPPED | OUTPATIENT
Start: 2024-11-11 | End: 2024-11-16

## 2024-11-11 RX ORDER — AZITHROMYCIN 250 MG/1
TABLET, FILM COATED ORAL
Qty: 6 TABLET | Refills: 0 | Status: SHIPPED | OUTPATIENT
Start: 2024-11-11 | End: 2024-11-16

## 2024-11-11 ASSESSMENT — ENCOUNTER SYMPTOMS
WHEEZING: 1
MUSCULOSKELETAL NEGATIVE: 1
COUGH: 1
PSYCHIATRIC NEGATIVE: 1
EYES NEGATIVE: 1
NEUROLOGICAL NEGATIVE: 1
GASTROINTESTINAL NEGATIVE: 1
CARDIOVASCULAR NEGATIVE: 1
FEVER: 1
ACTIVITY CHANGE: 1
CHILLS: 1
FATIGUE: 1
APPETITE CHANGE: 1
SHORTNESS OF BREATH: 1
HEMATOLOGIC/LYMPHATIC NEGATIVE: 1

## 2024-11-11 ASSESSMENT — PAIN SCALES - GENERAL: PAINLEVEL_OUTOF10: MILD PAIN (2)

## 2024-11-11 NOTE — PROGRESS NOTES
Owen Guzman  1995    ASSESSMENT/PLAN      Presents to rapid clinic with cough, congestion, fever, body aches signs of an systemic infection.  Patient has had symptoms for 2 weeks.  Patient has had exposure to pneumonia through his wife and daughter.  Chest x-ray obtained and shows no pneumonia.  Will treat for upper respiratory infection given exposure, length of symptoms. Patient's vitals are stable and he appears nontoxic.        1. Acute cough (Primary)  2. Wheezing  3. Fever in adult    - XR Chest 2 Views     4. Upper respiratory tract infection, unspecified type    - azithromycin (ZITHROMAX) 250 MG tablet; Take 2 tablets (500 mg) by mouth daily for 1 day, THEN 1 tablet (250 mg) daily for 4 days.  Dispense: 6 tablet; Refill: 0  - predniSONE (DELTASONE) 20 MG tablet; Take 2 tablets (40 mg) by mouth daily for 5 days.  Dispense: 10 tablet; Refill: 0  - albuterol (PROAIR HFA/PROVENTIL HFA/VENTOLIN HFA) 108 (90 Base) MCG/ACT inhaler; Inhale 2 puffs into the lungs every 6 hours as needed.  Dispense: 18 g; Refill: 0     - Symptomatic treatment - Encouraged fluids, salt water gargles, honey, humidifier, saline nasal spray, lozenges, tea, soup, smoothies, popsicles, topical vapor rub, rest, etc   - May use over-the-counter Tylenol or ibuprofen PRN  - Follow up as needed for new or worsening symptoms          *Explanation of diagnosis, treatment options and risk and benefits of medications reviewed with patient. Patient agrees with plan of care.  *All questions were answered.    *Red flags symptoms were discussed and patient was advised when they should return for reevaluation or for prompt emergency evaluation.   *Patient was given verbal and written instructions on plan of care. Instructions were printed or are available on [facility] on electronic AVS.   *We discussed potential side effects of any prescribed or recommended therapies, as well as expectations for response to treatments.  *Patient discharged in  "stable condition    Devika Aldridge, CNP  [facility]    SUBJECTIVE  CHIEF COMPLAINT/ REASON FOR VISIT  Patient presents with:  Pneumonia: Possible pneumonia      HISTORY OF PRESENT ILLNESS  Owen Guzman is a pleasant 29 year old male presents to rapid clinic today with cough, chest tightness, headache, chills, fatigue.  Patient has had exposure to pneumonia with both his wife and daughter having pneumonia last week.      I have reviewed the nursing notes.  I have reviewed allergies, medication list, problem list, and past medical history.    REVIEW OF SYSTEMS  Review of Systems   Constitutional:  Positive for activity change, appetite change, chills, fatigue and fever.   HENT:  Positive for congestion.    Eyes: Negative.    Respiratory:  Positive for cough, shortness of breath and wheezing.    Cardiovascular: Negative.    Gastrointestinal: Negative.    Genitourinary: Negative.    Musculoskeletal: Negative.    Skin: Negative.    Neurological: Negative.    Hematological: Negative.    Psychiatric/Behavioral: Negative.     All other systems reviewed and are negative.       VITAL SIGNS  Vitals:    11/11/24 1612   BP: 122/80   Pulse: 107   Resp: 18   Temp: 99.4  F (37.4  C)   TempSrc: Tympanic   SpO2: 98%   Weight: 127.2 kg (280 lb 6.4 oz)   Height: 1.753 m (5' 9\")      Body mass index is 41.41 kg/m .      OBJECTIVE  PHYSICAL EXAM  Physical Exam  Vitals and nursing note reviewed.   Constitutional:       Appearance: Normal appearance.   HENT:      Head: Normocephalic.      Right Ear: Tympanic membrane normal.      Left Ear: Tympanic membrane normal.      Nose: Nose normal.      Mouth/Throat:      Mouth: Mucous membranes are moist.   Eyes:      Pupils: Pupils are equal, round, and reactive to light.   Cardiovascular:      Rate and Rhythm: Normal rate and regular rhythm.      Pulses: Normal pulses.      Heart sounds: Normal heart sounds.   Pulmonary:      Effort: Pulmonary effort is normal.      " Breath sounds: Wheezing and rhonchi present.   Abdominal:      General: Bowel sounds are normal.   Musculoskeletal:         General: Normal range of motion.      Cervical back: Normal range of motion.   Skin:     General: Skin is warm and dry.      Capillary Refill: Capillary refill takes less than 2 seconds.   Neurological:      General: No focal deficit present.      Mental Status: He is alert.            DIAGNOSTICS  Results for orders placed or performed in visit on 11/11/24   XR Chest 2 Views     Status: None    Narrative    XR CHEST 2 VIEWS    HISTORY: 29 years Male Acute cough; Wheezing; Fever in adult    COMPARISON: None    TECHNIQUE: 2 views of the chest were obtained.    FINDINGS: Two views of the chest were obtained. Heart size and  pulmonary vascularity are within normal limits, lungs are clear on  both views. No consolidating air space opacities are present.          Impression    IMPRESSION: Clear chest.    MEEK BANKS MD         SYSTEM ID:  [id number]

## 2024-11-11 NOTE — PROGRESS NOTES
"Patient presents to  with possible pneumonia. Reports chest tightness and congestion, headache, chills, fatigue, hot flashes, productive cough. States spouse and daughter have/had pneumonia in the last x2 weeks.     Chief Complaint   Patient presents with    Pneumonia     Possible pneumonia        FOOD SECURITY SCREENING QUESTIONS  Hunger Vital Signs:  Within the past 12 months we worried whether our food would run out before we got money to buy more. Never  Within the past 12 months the food we bought just didn't last and we didn't have money to get more. Never  Sania Diane LPN 11/11/2024 4:18 PM      Initial /80   Pulse 107   Temp 99.4  F (37.4  C) (Tympanic)   Resp 18   Ht 1.753 m (5' 9\")   Wt 127.2 kg (280 lb 6.4 oz)   SpO2 98%   BMI 41.41 kg/m   Estimated body mass index is 41.41 kg/m  as calculated from the following:    Height as of this encounter: 1.753 m (5' 9\").    Weight as of this encounter: 127.2 kg (280 lb 6.4 oz).  Medication Reconciliation: complete    Sania Diane LPN  "

## 2025-07-03 ENCOUNTER — OFFICE VISIT (OUTPATIENT)
Dept: FAMILY MEDICINE | Facility: OTHER | Age: 30
End: 2025-07-03
Payer: COMMERCIAL

## 2025-07-03 ENCOUNTER — RESULTS FOLLOW-UP (OUTPATIENT)
Dept: FAMILY MEDICINE | Facility: OTHER | Age: 30
End: 2025-07-03

## 2025-07-03 VITALS
TEMPERATURE: 99 F | HEART RATE: 96 BPM | SYSTOLIC BLOOD PRESSURE: 119 MMHG | OXYGEN SATURATION: 97 % | BODY MASS INDEX: 41.71 KG/M2 | WEIGHT: 281.6 LBS | HEIGHT: 69 IN | RESPIRATION RATE: 20 BRPM | DIASTOLIC BLOOD PRESSURE: 75 MMHG

## 2025-07-03 DIAGNOSIS — J06.9 UPPER RESPIRATORY TRACT INFECTION, UNSPECIFIED TYPE: Primary | ICD-10-CM

## 2025-07-03 DIAGNOSIS — R07.89 RIGHT-SIDED CHEST WALL PAIN: ICD-10-CM

## 2025-07-03 LAB
BASOPHILS # BLD AUTO: 0.1 10E3/UL (ref 0–0.2)
BASOPHILS NFR BLD AUTO: 1 %
CRP SERPL-MCNC: 4.1 MG/L
D DIMER PPP FEU-MCNC: <0.27 UG/ML FEU (ref 0–0.5)
EOSINOPHIL # BLD AUTO: 0.1 10E3/UL (ref 0–0.7)
EOSINOPHIL NFR BLD AUTO: 2 %
ERYTHROCYTE [DISTWIDTH] IN BLOOD BY AUTOMATED COUNT: 13.6 % (ref 10–15)
HCT VFR BLD AUTO: 47.8 % (ref 40–53)
HGB BLD-MCNC: 16.2 G/DL (ref 13.3–17.7)
IMM GRANULOCYTES # BLD: 0 10E3/UL
IMM GRANULOCYTES NFR BLD: 0 %
LYMPHOCYTES # BLD AUTO: 1.4 10E3/UL (ref 0.8–5.3)
LYMPHOCYTES NFR BLD AUTO: 18 %
MCH RBC QN AUTO: 29.5 PG (ref 26.5–33)
MCHC RBC AUTO-ENTMCNC: 33.9 G/DL (ref 31.5–36.5)
MCV RBC AUTO: 87 FL (ref 78–100)
MONOCYTES # BLD AUTO: 0.5 10E3/UL (ref 0–1.3)
MONOCYTES NFR BLD AUTO: 6 %
NEUTROPHILS # BLD AUTO: 5.6 10E3/UL (ref 1.6–8.3)
NEUTROPHILS NFR BLD AUTO: 73 %
NRBC # BLD AUTO: 0 10E3/UL
NRBC BLD AUTO-RTO: 0 /100
PLATELET # BLD AUTO: 204 10E3/UL (ref 150–450)
RBC # BLD AUTO: 5.5 10E6/UL (ref 4.4–5.9)
TROPONIN T SERPL HS-MCNC: <6 NG/L
WBC # BLD AUTO: 7.7 10E3/UL (ref 4–11)

## 2025-07-03 PROCEDURE — 86140 C-REACTIVE PROTEIN: CPT | Mod: ZL | Performed by: STUDENT IN AN ORGANIZED HEALTH CARE EDUCATION/TRAINING PROGRAM

## 2025-07-03 PROCEDURE — 36415 COLL VENOUS BLD VENIPUNCTURE: CPT | Mod: ZL | Performed by: STUDENT IN AN ORGANIZED HEALTH CARE EDUCATION/TRAINING PROGRAM

## 2025-07-03 PROCEDURE — 85379 FIBRIN DEGRADATION QUANT: CPT | Mod: ZL | Performed by: STUDENT IN AN ORGANIZED HEALTH CARE EDUCATION/TRAINING PROGRAM

## 2025-07-03 PROCEDURE — 84484 ASSAY OF TROPONIN QUANT: CPT | Mod: ZL | Performed by: STUDENT IN AN ORGANIZED HEALTH CARE EDUCATION/TRAINING PROGRAM

## 2025-07-03 PROCEDURE — 85004 AUTOMATED DIFF WBC COUNT: CPT | Mod: ZL | Performed by: STUDENT IN AN ORGANIZED HEALTH CARE EDUCATION/TRAINING PROGRAM

## 2025-07-03 RX ORDER — PREDNISONE 20 MG/1
40 TABLET ORAL DAILY
Qty: 10 TABLET | Refills: 0 | Status: SHIPPED | OUTPATIENT
Start: 2025-07-03 | End: 2025-07-08

## 2025-07-03 ASSESSMENT — PAIN SCALES - GENERAL: PAINLEVEL_OUTOF10: SEVERE PAIN (8)

## 2025-07-03 NOTE — PROGRESS NOTES
Assessment & Plan     (J06.9) Upper respiratory tract infection, unspecified type  (primary encounter diagnosis)    Comment: Viral URI.  Vital signs are stable.  Chest x-ray without any evidence of pneumonia.  Right-sided chest wall pain associated.  This most likely is musculoskeletal from coughing or exercise.  Discussed further lab work which patient would like to pursue.  Troponin, D-dimer, CBC, and CRP all negative/normal today.    Plan: XR Chest 2 Views, predniSONE (DELTASONE) 20 MG         tablet          At this time, prednisone 40 mg once a day for 5 days.  Continue ibuprofen.  Tylenol as well.  Heat and/or ice as needed.  Follow-up with primary care if symptoms do continue to persist.  Close return precautions to the rapid clinic or ER if symptoms were to worsen or change.  He is comfortable and agreeable with this plan.    (R07.89) Right-sided chest wall pain  Plan: CBC and Differential, D dimer quantitative,         Troponin T, High Sensitivity, CRP inflammation            Subjective   Owen is a 30 year old, presenting for the following health issues:  URI and Chest Pain (Right side)    HPI     Patient presents today with concerns of cough, congestion, sore throat.  He notes symptoms started 4 days ago, the evening after he was golfing.  He notes between yesterday and today he has developed some chest wall pain on the right side of his chest.  He notes it is a sharp, stabbing pain, intermittent.  He notes it seems to extend from around his scapula to his upper anterior chest.  He has noticed some congestion in the right side that does improve when he coughs.  He has been using some allergy medications.  He has not noticed any fevers.  He does have a history of pneumonia.      Review of Systems  Constitutional, HEENT, cardiovascular, pulmonary, gi and gu systems are negative, except as otherwise noted.        Objective    /75 (BP Location: Left arm, Patient Position: Sitting, Cuff Size: Adult  "Regular)   Pulse 96   Temp 99  F (37.2  C) (Temporal)   Resp 20   Ht 1.753 m (5' 9\")   Wt 127.7 kg (281 lb 9.6 oz)   SpO2 97%   BMI 41.59 kg/m    Body mass index is 41.59 kg/m .    Physical Exam   GENERAL: alert and no distress  EYES: Eyes grossly normal to inspection, PERRL and conjunctivae and sclerae normal  HENT: ear canals and TM's normal, nose and mouth without ulcers or lesions  NECK: no adenopathy, no asymmetry, masses, or scars  RESP: Faint wheeze in the anterior upper right chest, otherwise clear to auscultation  CV: regular rate and rhythm, normal S1 S2, no S3 or S4, no murmur, click or rub, no peripheral edema  MS: no gross musculoskeletal defects noted, no edema    Results for orders placed or performed during the hospital encounter of 07/03/25   XR Chest 2 Views     Status: None    Narrative    PROCEDURE:  XR CHEST 2 VIEWS    HISTORY:  cough, right upper chest wheezing; Upper respiratory tract  infection, unspecified type.     COMPARISON:  11/11/2024    FINDINGS:   The cardiac silhouette is normal in size. The pulmonary vasculature is  normal.  The lungs are clear. No pleural effusion or pneumothorax.      Impression    IMPRESSION:  No acute cardiopulmonary disease.      PING LINDSEY MD         SYSTEM ID:  [id number]   Results for orders placed or performed in visit on 07/03/25   D dimer quantitative     Status: Normal   Result Value Ref Range    D-Dimer Quantitative <0.27 0.00 - 0.50 ug/mL FEU    Narrative    This D-dimer assay is intended for use in conjunction with a clinical pretest probability assessment model to exclude pulmonary embolism (PE) and deep venous thrombosis (DVT) in outpatients suspected of PE or DVT. The cut-off value is 0.50 ug/mL FEU.   Troponin T, High Sensitivity     Status: Normal   Result Value Ref Range    Troponin T, High Sensitivity <6 <=22 ng/L   CRP inflammation     Status: Normal   Result Value Ref Range    CRP Inflammation 4.10 <5.00 mg/L   CBC with platelets " and differential     Status: None   Result Value Ref Range    WBC Count 7.7 4.0 - 11.0 10e3/uL    RBC Count 5.50 4.40 - 5.90 10e6/uL    Hemoglobin 16.2 13.3 - 17.7 g/dL    Hematocrit 47.8 40.0 - 53.0 %    MCV 87 78 - 100 fL    MCH 29.5 26.5 - 33.0 pg    MCHC 33.9 31.5 - 36.5 g/dL    RDW 13.6 10.0 - 15.0 %    Platelet Count 204 150 - 450 10e3/uL    % Neutrophils 73 %    % Lymphocytes 18 %    % Monocytes 6 %    % Eosinophils 2 %    % Basophils 1 %    % Immature Granulocytes 0 %    NRBCs per 100 WBC 0 <1 /100    Absolute Neutrophils 5.6 1.6 - 8.3 10e3/uL    Absolute Lymphocytes 1.4 0.8 - 5.3 10e3/uL    Absolute Monocytes 0.5 0.0 - 1.3 10e3/uL    Absolute Eosinophils 0.1 0.0 - 0.7 10e3/uL    Absolute Basophils 0.1 0.0 - 0.2 10e3/uL    Absolute Immature Granulocytes 0.0 <=0.4 10e3/uL    Absolute NRBCs 0.0 10e3/uL   CBC and Differential     Status: None    Narrative    The following orders were created for panel order CBC and Differential.  Procedure                               Abnormality         Status                     ---------                               -----------         ------                     CBC with platelets and ...[[id number]]                      Final result                 Please view results for these tests on the individual orders.         Signed Electronically by: Shereen Nguyen PA-C

## 2025-07-03 NOTE — NURSING NOTE
"Chief Complaint   Patient presents with    URI    Chest Pain     Right side   Patient presents to the clinic today for upper respiratory issues. Patient states this started Sunday, Monday it got way worse. Patient states when he coughs he gets pain in the right side of his chest as well.     Initial /75 (BP Location: Left arm, Patient Position: Sitting, Cuff Size: Adult Regular)   Pulse 96   Temp 99  F (37.2  C) (Temporal)   Resp 20   Ht 1.753 m (5' 9\")   Wt 127.7 kg (281 lb 9.6 oz)   SpO2 97%   BMI 41.59 kg/m   Estimated body mass index is 41.59 kg/m  as calculated from the following:    Height as of this encounter: 1.753 m (5' 9\").    Weight as of this encounter: 127.7 kg (281 lb 9.6 oz).  Medication Review: complete    The next two questions are to help us understand your food security.  If you are feeling you need any assistance in this area, we have resources available to support you today.          7/3/2025   SDOH- Food Insecurity   Within the past 12 months, did you worry that your food would run out before you got money to buy more? N   Within the past 12 months, did the food you bought just not last and you didn t have money to get more? N         Health Care Directive:  Patient does not have a Health Care Directive: Discussed advance care planning with patient; however, patient declined at this time.    Chong De Los Santos      "

## 2025-08-30 ENCOUNTER — HEALTH MAINTENANCE LETTER (OUTPATIENT)
Age: 30
End: 2025-08-30

## (undated) RX ORDER — NEOMYCIN/BACITRACIN/POLYMYXINB 3.5-400-5K
OINTMENT (GRAM) TOPICAL
Status: DISPENSED
Start: 2022-03-30